# Patient Record
(demographics unavailable — no encounter records)

---

## 2025-07-23 NOTE — HISTORY OF PRESENT ILLNESS
[FreeTextEntry1] : Pippa Ashby is a 64- year- old female with hypertension, Diabetes, cardiomyopathy and Atrial fibrillation an s/p cardioversion, now in sinus rhythm comes for cardiac evaluation. In January 2025 she was admitted to [facility] with congestive heart failure and was treated. [facility] AT [facility]. deniers ANY CHEST PAIN OR PALPITATIONS. oN AND OFF SHORTNESS OF BREATH. complaining OF ON AND OFF DIZZINESS AND FATIGUE. complaints TO MEDICATIONS.

## 2025-07-23 NOTE — PHYSICAL EXAM
[No Carotid Bruit] : no carotid bruit [Soft] : abdomen soft [Non Tender] : non-tender [Normal Bowel Sounds] : normal bowel sounds [Normal Gait] : normal gait [No Edema] : no edema [No Cyanosis] : no cyanosis [Normal] : alert and oriented, normal memory

## 2025-07-23 NOTE — DISCUSSION/SUMMARY
[FreeTextEntry1] : In a summary Pippa Ashby is a middle- aged female with Hypertension, continue Metoprolol. Atrial fibrillation, now in sinus rhythm. Continue Eliquis. Cardiomyopathies continue Lasix. Diabetes says taking Ozempic and Jardiance. Bring list of all medications. Follow with PCP. Follow up in 1 month.

## 2025-07-23 NOTE — REVIEW OF SYSTEMS
[Feeling Fatigued] : feeling fatigued [Dyspnea on exertion] : dyspnea during exertion [Joint Pain] : joint pain [Dizziness] : dizziness [Negative] : Respiratory [Fever] : no fever [Headache] : no headache [Chills] : no chills [Chest Discomfort] : no chest discomfort [Lower Ext Edema] : no extremity edema [Palpitations] : no palpitations [Orthopnea] : no orthopnea [PND] : no PND [Abdominal Pain] : no abdominal pain [Nausea] : no nausea [Vomiting] : no vomiting [Heartburn] : no heartburn [Confusion] : no confusion was observed [Anxiety] : no anxiety [Under Stress] : not under stress [Easy Bleeding] : no tendency for easy bleeding [Easy Bruising] : no tendency for easy bruising